# Patient Record
Sex: MALE | Race: ASIAN | NOT HISPANIC OR LATINO | Employment: UNEMPLOYED | URBAN - METROPOLITAN AREA
[De-identification: names, ages, dates, MRNs, and addresses within clinical notes are randomized per-mention and may not be internally consistent; named-entity substitution may affect disease eponyms.]

---

## 2022-04-06 ENCOUNTER — OFFICE VISIT (OUTPATIENT)
Dept: INTERNAL MEDICINE | Facility: CLINIC | Age: 27
End: 2022-04-06

## 2022-04-06 ENCOUNTER — LAB (OUTPATIENT)
Dept: LAB | Facility: HOSPITAL | Age: 27
End: 2022-04-06

## 2022-04-06 VITALS
DIASTOLIC BLOOD PRESSURE: 72 MMHG | SYSTOLIC BLOOD PRESSURE: 128 MMHG | HEART RATE: 71 BPM | HEIGHT: 69 IN | WEIGHT: 277.6 LBS | BODY MASS INDEX: 41.12 KG/M2 | TEMPERATURE: 97.6 F | OXYGEN SATURATION: 98 % | RESPIRATION RATE: 12 BRPM

## 2022-04-06 DIAGNOSIS — M62.838 MUSCLE SPASM: ICD-10-CM

## 2022-04-06 DIAGNOSIS — R10.32 LEFT INGUINAL PAIN: ICD-10-CM

## 2022-04-06 DIAGNOSIS — Z00.00 HEALTH CARE MAINTENANCE: ICD-10-CM

## 2022-04-06 DIAGNOSIS — N50.812 LEFT TESTICULAR PAIN: Primary | ICD-10-CM

## 2022-04-06 DIAGNOSIS — Z23 NEED FOR TDAP VACCINATION: ICD-10-CM

## 2022-04-06 PROCEDURE — 99204 OFFICE O/P NEW MOD 45 MIN: CPT | Performed by: PHYSICIAN ASSISTANT

## 2022-04-06 RX ORDER — CYCLOBENZAPRINE HCL 10 MG
10 TABLET ORAL 3 TIMES DAILY PRN
Qty: 30 TABLET | Refills: 0 | Status: SHIPPED | OUTPATIENT
Start: 2022-04-06

## 2022-04-06 NOTE — PROGRESS NOTES
[facility] PR[facility]  [address]  [facility] [address]  Dept: [phone]  Dept Fax: [phone]  Loc: [phone]  Loc Fax: [phone]    Huber Maradiaga  1995    New Patient Office Note    History of Present Illness:  Patient is a 26-year-old male in today to establish care and for left hip pain as well as left groin pain and left testicular pain.  Patient reports these pain started approximately couple weeks ago when he stayed at a friend's house.  Patient slept on a couch which was very uncomfortable which he believes may have injured his left hip.  Patient states that he also is lactose intolerant and ate a pizza while there which caused him to have abdominal discomfort and some nausea and vomiting.  Patient reports overall symptoms are improving but was also concerned because he was having pain in his left testicle and left groin that radiated up into his left side.  Patient also had some dysuria concerned that he might have urinary tract infection.  Patient states that he has not had anything to drink today and does not need to use the bathroom right now but will wait around to drink some water and see if he can provide a urinalysis.    Hip Pain         The following portions of the patient's history were reviewed and updated as appropriate: allergies, current medications, past family history, past medical history, past social history, past surgical history, and problem list.    Medications:    Current Outpatient Medications:   •  cyclobenzaprine (FLEXERIL) 10 MG tablet, Take 1 tablet by mouth 3 (Three) Times a Day As Needed for Muscle Spasms., Disp: 30 tablet, Rfl: 0    Subjective  No Known Allergies     Past Medical History:   Diagnosis Date   • Depression        Past Surgical History:   Procedure Laterality Date   • CYST REMOVAL         Family History   Problem Relation Age of Onset   • Mental illness Mother    • Mental illness Son    • Mental  "illness Maternal Aunt    • Cancer Maternal Aunt    • Cancer Maternal Grandmother    • Cancer Maternal Grandfather         Social History     Socioeconomic History   • Marital status: Single   Tobacco Use   • Smoking status: Never Smoker   • Smokeless tobacco: Never Used   Vaping Use   • Vaping Use: Never used   Substance and Sexual Activity   • Alcohol use: Not Currently   • Drug use: Never   • Sexual activity: Defer       Review of Systems   Constitutional: Negative for activity change, chills, fatigue, fever and unexpected weight change.   HENT: Negative for congestion, ear pain, postnasal drip, sinus pressure and sore throat.    Eyes: Negative for pain, discharge and redness.   Respiratory: Negative for cough, shortness of breath and wheezing.    Cardiovascular: Negative for chest pain, palpitations and leg swelling.   Gastrointestinal: Negative for diarrhea, nausea and vomiting.   Endocrine: Negative for cold intolerance and heat intolerance.   Genitourinary: Positive for dysuria, frequency, testicular pain and urgency. Negative for decreased urine volume.   Musculoskeletal: Negative for arthralgias and myalgias.   Skin: Negative for rash and wound.   Neurological: Negative for dizziness, light-headedness and headaches.   Hematological: Does not bruise/bleed easily.   Psychiatric/Behavioral: Negative for confusion, dysphoric mood and sleep disturbance. The patient is not nervous/anxious.        Objective  Vitals:    04/06/22 1041   BP: 128/72   Pulse: 71   Resp: 12   Temp: 97.6 °F (36.4 °C)   SpO2: 98%   Weight: 126 kg (277 lb 9.6 oz)   Height: 175.3 cm (69\")       Physical Exam  Physical Exam  Vitals and nursing note reviewed.   Constitutional:       General: He is not in acute distress.     Appearance: He is not ill-appearing.   HENT:      Head: Normocephalic.      Right Ear: Tympanic membrane, ear canal and external ear normal. There is no impacted cerumen.      Left Ear: Tympanic membrane, ear canal and " external ear normal. There is no impacted cerumen.      Nose: No congestion or rhinorrhea.      Mouth/Throat:      Mouth: Mucous membranes are moist.      Pharynx: Oropharynx is clear. No oropharyngeal exudate or posterior oropharyngeal erythema.   Eyes:      General:         Right eye: No discharge.         Left eye: No discharge.      Extraocular Movements: Extraocular movements intact.      Conjunctiva/sclera: Conjunctivae normal.      Pupils: Pupils are equal, round, and reactive to light.   Cardiovascular:      Rate and Rhythm: Normal rate and regular rhythm.      Heart sounds: Normal heart sounds. No murmur heard.    No friction rub. No gallop.   Pulmonary:      Effort: Pulmonary effort is normal. No respiratory distress.      Breath sounds: Normal breath sounds. No wheezing.   Abdominal:      General: Bowel sounds are normal. There is no distension.      Palpations: Abdomen is soft. There is no mass.      Tenderness: There is no abdominal tenderness.   Musculoskeletal:         General: Tenderness (To palpation of transversus abdominis muscles on exam) present. No swelling. Normal range of motion.      Cervical back: Normal range of motion. No tenderness.      Right lower leg: No edema.      Left lower leg: No edema.   Lymphadenopathy:      Cervical: No cervical adenopathy.   Skin:     Findings: No bruising, erythema or rash.   Neurological:      Mental Status: He is oriented to person, place, and time.      Gait: Gait normal.   Psychiatric:         Mood and Affect: Mood normal.         Behavior: Behavior normal.         Thought Content: Thought content normal.         Judgment: Judgment normal.         Diagnostic Data  Procedures    Assessment  Diagnoses and all orders for this visit:    1. Left testicular pain (Primary)  -     US Pelvis Complete; Future    2. Left inguinal pain  -     US scrotum and testicles; Future    3. Muscle spasm    4. Health care maintenance  -     CBC w AUTO Differential; Future  -      Comprehensive Metabolic Panel  -     TSH; Future  -     Hemoglobin A1c; Future  -     Lipid Panel  -     Hepatitis C Antibody  -     CBC w AUTO Differential  -     TSH  -     Hemoglobin A1c    5. Need for Tdap vaccination    Other orders  -     cyclobenzaprine (FLEXERIL) 10 MG tablet; Take 1 tablet by mouth 3 (Three) Times a Day As Needed for Muscle Spasms.  Dispense: 30 tablet; Refill: 0        Plan    1. Left testicular pain (Primary)- overall improvement but still having discomfort at times, will obtain u/s scrotum.    2. Left inguinal pain- overall better but still having sx at times, will obtain u/s pelvis to r/o inguinal hernia.    3. Muscle spasm- sent in flexeril 10 mg qhs prn.    4. Health care maintenance- obtain fasting labs.    5. Need for Tdap vaccination- declined.        No follow-ups on file.    Jose Juan Knight PA-C  04/06/2022